# Patient Record
Sex: FEMALE | Race: WHITE | HISPANIC OR LATINO | URBAN - METROPOLITAN AREA
[De-identification: names, ages, dates, MRNs, and addresses within clinical notes are randomized per-mention and may not be internally consistent; named-entity substitution may affect disease eponyms.]

---

## 2017-12-27 DIAGNOSIS — I87.2 CHRONIC VENOUS INSUFFICIENCY: Primary | ICD-10-CM

## 2019-06-12 ENCOUNTER — TELEPHONE (OUTPATIENT)
Dept: ELECTROPHYSIOLOGY | Facility: CLINIC | Age: 29
End: 2019-06-12

## 2019-06-12 NOTE — TELEPHONE ENCOUNTER
----- Message from Donald Stallworth sent at 6/12/2019 12:14 PM CDT -----  Pt is being referred to Dr Paige. I have scanned the referral/records to media mgr within Epic. Please review and contact for scheduling,thanks

## 2019-06-12 NOTE — TELEPHONE ENCOUNTER
Called Manish re referral from Dr. Carpenter's office.  Manish asked if Dr. Paige's apt will be covered by her insurance or not; adv her insurance is with [employer] through [facility].  I adv I can not answer that question, but will send a message to Cherie Harris to see if she can help find out that info.  Per Manish's request, would like to know if apt will be covered or not before scheduling the apt, bc she said she came to [facility] for apt once before & didn't find out the apt wasn't covered until she got here.  I told Manish I completely understand; adv I will be in touch with her once I find out (if i'm able to find info out); adv I didn't know how long that info takes to get, so not sure if I would be able to call her back today to let her know, but hopefully by tomorrow we will have an answer & will get back with her then.  Manish verbalized understanding to all.

## 2019-07-15 ENCOUNTER — TELEPHONE (OUTPATIENT)
Dept: ELECTROPHYSIOLOGY | Facility: CLINIC | Age: 29
End: 2019-07-15

## 2019-07-15 NOTE — TELEPHONE ENCOUNTER
----- Message from Jennifer Harvey sent at 7/15/2019  4:10 PM CDT -----  Contact: Pt called   Pt returning call. Please call pt @ [phone]. Thank you.

## 2019-07-15 NOTE — TELEPHONE ENCOUNTER
Called Manish re her call back to me.  Per trish Hammond she was given an authorization# from her ins company for an apt with Dr. Paige & authorization expires 8/3.  I read Mrs. Cherie Harris's response to my message about how we are not contracted with pts ins.  Pt and I are/were both confused, asked pt if she wanted to call Mrs. Cherie Harris directly for better understanding.  Binta said yes she would like to do that, gave her Mrs. Crespo's direct#([phone]) & Manish chambers she will call Mrs. Crespo tomorrow.  I told Manish to give me a call if anything changes.

## 2019-07-15 NOTE — TELEPHONE ENCOUNTER
----- Message from Cherie Murray sent at 7/15/2019 10:47 AM CDT -----  Homer Root Ms Root,    I thought I responded. We are NOT contracted with this patient insurance. She will not be covered here to see Dr Paige.  Cherie MAGDALENO   [facility]    ----- Message -----  From: Ivett Perry MA  Sent: 7/12/2019   9:55 AM  To: Cherie Coronel Mrs. Crespo,     I sent you a message a few weeks ago about this patient and seeing if her insurance will cover Dr. Paige's appointment.  Can you please look back into this since authorization has been sent over from her insurance?      Thank you again for your help!  Ivett    ----- Message -----  From: Donald Stallworth  Sent: 7/12/2019   8:12 AM  To: [facility]rec[facility] Flores Staff    Authorization for pt has been sent over and has been scanned into media mgr.

## 2019-07-15 NOTE — TELEPHONE ENCOUNTER
Called & l/m for Manish Paige's apt not being covered by her ins.  Adv her to call if she would like to still go fwd with apt, if not, call us if she needs anything else.